# Patient Record
Sex: FEMALE | Race: WHITE | Employment: UNEMPLOYED | URBAN - METROPOLITAN AREA
[De-identification: names, ages, dates, MRNs, and addresses within clinical notes are randomized per-mention and may not be internally consistent; named-entity substitution may affect disease eponyms.]

---

## 2019-04-10 ENCOUNTER — LAB ENCOUNTER (OUTPATIENT)
Dept: LAB | Age: 39
End: 2019-04-10
Attending: NURSE PRACTITIONER
Payer: COMMERCIAL

## 2019-04-10 DIAGNOSIS — Z13.0 SCREENING, ANEMIA, DEFICIENCY, IRON: ICD-10-CM

## 2019-04-10 DIAGNOSIS — Z13.220 SCREENING CHOLESTEROL LEVEL: ICD-10-CM

## 2019-04-10 DIAGNOSIS — Z13.1 SCREENING FOR DIABETES MELLITUS: ICD-10-CM

## 2019-04-10 DIAGNOSIS — Z13.29 SCREENING FOR THYROID DISORDER: ICD-10-CM

## 2019-04-10 PROCEDURE — 85025 COMPLETE CBC W/AUTO DIFF WBC: CPT

## 2019-04-10 PROCEDURE — 84443 ASSAY THYROID STIM HORMONE: CPT

## 2019-04-10 PROCEDURE — 80048 BASIC METABOLIC PNL TOTAL CA: CPT

## 2019-04-10 PROCEDURE — 80061 LIPID PANEL: CPT

## 2019-04-10 PROCEDURE — 36415 COLL VENOUS BLD VENIPUNCTURE: CPT

## 2019-12-05 ENCOUNTER — HOSPITAL ENCOUNTER (OUTPATIENT)
Age: 39
Discharge: HOME | End: 2019-12-05
Payer: COMMERCIAL

## 2019-12-05 ENCOUNTER — APPOINTMENT (OUTPATIENT)
Dept: GENERAL RADIOLOGY | Age: 39
End: 2019-12-05
Attending: PHYSICIAN ASSISTANT
Payer: COMMERCIAL

## 2019-12-05 VITALS
RESPIRATION RATE: 18 BRPM | TEMPERATURE: 98 F | HEART RATE: 102 BPM | DIASTOLIC BLOOD PRESSURE: 83 MMHG | SYSTOLIC BLOOD PRESSURE: 132 MMHG | OXYGEN SATURATION: 100 %

## 2019-12-05 DIAGNOSIS — S29.011A CHEST WALL MUSCLE STRAIN, INITIAL ENCOUNTER: Primary | ICD-10-CM

## 2019-12-05 PROCEDURE — 99203 OFFICE O/P NEW LOW 30 MIN: CPT

## 2019-12-05 PROCEDURE — 71101 X-RAY EXAM UNILAT RIBS/CHEST: CPT | Performed by: PHYSICIAN ASSISTANT

## 2019-12-05 PROCEDURE — 99204 OFFICE O/P NEW MOD 45 MIN: CPT

## 2019-12-05 RX ORDER — LIDOCAINE 50 MG/G
1 PATCH TOPICAL EVERY 24 HOURS
Qty: 3 PATCH | Refills: 0 | Status: SHIPPED | OUTPATIENT
Start: 2019-12-05 | End: 2019-12-13

## 2019-12-05 NOTE — ED INITIAL ASSESSMENT (HPI)
Pt with cough x 3 weeks, started as a dry cough, now productive for 2 weeks, 4 days R sided rib pain; no fever

## 2019-12-05 NOTE — ED PROVIDER NOTES
Patient Seen in: [facility]      History   Patient presents with:  Cough/URI  Pain    Stated Complaint: cough x 3 weeks,rt side chest/rib pain x 3 days    HPI    CHIEF COMPLAINT: Cough for the past 3 weeks, right-sided rib pain systems reviewed and negative except as noted above.     Physical Exam     ED Triage Vitals [12/05/19 1524]   /85   Pulse 107   Resp 14   Temp 98.4 °F (36.9 °C)   Temp src Temporal   SpO2 99 %   O2 Device None (Room air)       Current:/85   Puls Views), Right  (cpt=71101)    Result Date: 12/5/2019  CONCLUSION:  No evidence of a right rib fracture.     Dictated by: Rufus Hatchet, MD on 12/05/2019 at 16:40     Approved by: Rufus Hatchet, MD on 12/05/2019 at 16:40          Patient symptoms are

## 2021-07-22 PROCEDURE — 88175 CYTOPATH C/V AUTO FLUID REDO: CPT | Performed by: NURSE PRACTITIONER

## 2021-07-22 PROCEDURE — 87624 HPV HI-RISK TYP POOLED RSLT: CPT | Performed by: NURSE PRACTITIONER

## 2024-05-29 ENCOUNTER — HOSPITAL ENCOUNTER (OUTPATIENT)
Dept: MAMMOGRAPHY | Age: 44
Discharge: HOME | End: 2024-05-29
Payer: COMMERCIAL

## 2024-05-29 DIAGNOSIS — Z12.31 ENCOUNTER FOR SCREENING MAMMOGRAM FOR BREAST CANCER: ICD-10-CM

## 2024-05-29 PROCEDURE — 77067 SCR MAMMO BI INCL CAD: CPT

## 2024-05-29 PROCEDURE — 77063 BREAST TOMOSYNTHESIS BI: CPT

## 2024-05-31 ENCOUNTER — LAB ENCOUNTER (OUTPATIENT)
Dept: LAB | Age: 44
End: 2024-05-31
Payer: COMMERCIAL

## 2024-05-31 DIAGNOSIS — N91.5 OLIGOMENORRHEA, UNSPECIFIED TYPE: ICD-10-CM

## 2024-05-31 DIAGNOSIS — Z13.1 ENCOUNTER FOR SCREENING EXAMINATION FOR IMPAIRED GLUCOSE REGULATION AND DIABETES MELLITUS: ICD-10-CM

## 2024-05-31 LAB
ALBUMIN SERPL-MCNC: 4.7 G/DL (ref 3.2–4.8)
ALBUMIN/GLOB SERPL: 1.7 {RATIO} (ref 1–2)
ALP LIVER SERPL-CCNC: 62 U/L
ALT SERPL-CCNC: 12 U/L
ANION GAP SERPL CALC-SCNC: 8 MMOL/L (ref 0–18)
AST SERPL-CCNC: 27 U/L (ref ?–34)
BILIRUB SERPL-MCNC: 0.4 MG/DL (ref 0.3–1.2)
BUN BLD-MCNC: 12 MG/DL (ref 9–23)
BUN/CREAT SERPL: 14.8 (ref 10–20)
CALCIUM BLD-MCNC: 9.7 MG/DL (ref 8.7–10.4)
CHLORIDE SERPL-SCNC: 105 MMOL/L (ref 98–112)
CO2 SERPL-SCNC: 29 MMOL/L (ref 21–32)
CREAT BLD-MCNC: 0.81 MG/DL
EGFRCR SERPLBLD CKD-EPI 2021: 92 ML/MIN/1.73M2 (ref 60–?)
ESTRADIOL SERPL-MCNC: 35.6 PG/ML
FASTING STATUS PATIENT QL REPORTED: NO
GLOBULIN PLAS-MCNC: 2.8 G/DL (ref 2–3.5)
GLUCOSE BLD-MCNC: 106 MG/DL (ref 70–99)
HCG SERPL QL: NEGATIVE
OSMOLALITY SERPL CALC.SUM OF ELEC: 294 MOSM/KG (ref 275–295)
POTASSIUM SERPL-SCNC: 3.4 MMOL/L (ref 3.5–5.1)
PROLACTIN SERPL-MCNC: 7.5 NG/ML
PROT SERPL-MCNC: 7.5 G/DL (ref 5.7–8.2)
SODIUM SERPL-SCNC: 142 MMOL/L (ref 136–145)

## 2024-05-31 PROCEDURE — 80061 LIPID PANEL: CPT

## 2024-05-31 PROCEDURE — 36415 COLL VENOUS BLD VENIPUNCTURE: CPT

## 2024-05-31 PROCEDURE — 84443 ASSAY THYROID STIM HORMONE: CPT

## 2024-05-31 PROCEDURE — 83002 ASSAY OF GONADOTROPIN (LH): CPT

## 2024-05-31 PROCEDURE — 84703 CHORIONIC GONADOTROPIN ASSAY: CPT

## 2024-05-31 PROCEDURE — 85025 COMPLETE CBC W/AUTO DIFF WBC: CPT

## 2024-05-31 PROCEDURE — 80053 COMPREHEN METABOLIC PANEL: CPT

## 2024-05-31 PROCEDURE — 84146 ASSAY OF PROLACTIN: CPT

## 2024-05-31 PROCEDURE — 83001 ASSAY OF GONADOTROPIN (FSH): CPT

## 2024-05-31 PROCEDURE — 82670 ASSAY OF TOTAL ESTRADIOL: CPT

## 2024-05-31 PROCEDURE — 82306 VITAMIN D 25 HYDROXY: CPT

## 2024-06-13 ENCOUNTER — OFFICE VISIT (OUTPATIENT)
Facility: CLINIC | Age: 44
End: 2024-06-13
Payer: COMMERCIAL

## 2024-06-13 VITALS
HEIGHT: 66 IN | WEIGHT: 137 LBS | SYSTOLIC BLOOD PRESSURE: 112 MMHG | DIASTOLIC BLOOD PRESSURE: 84 MMHG | BODY MASS INDEX: 22.02 KG/M2

## 2024-06-13 DIAGNOSIS — Z79.890 PREMATURE MENOPAUSE ON HORMONE REPLACEMENT THERAPY: ICD-10-CM

## 2024-06-13 DIAGNOSIS — E28.319 PREMATURE MENOPAUSE ON HORMONE REPLACEMENT THERAPY: ICD-10-CM

## 2024-06-13 DIAGNOSIS — E28.319 PREMATURE MENOPAUSE: Primary | ICD-10-CM

## 2024-06-13 PROCEDURE — 99213 OFFICE O/P EST LOW 20 MIN: CPT | Performed by: OBSTETRICS & GYNECOLOGY

## 2024-06-13 PROCEDURE — 3079F DIAST BP 80-89 MM HG: CPT | Performed by: OBSTETRICS & GYNECOLOGY

## 2024-06-13 PROCEDURE — 3008F BODY MASS INDEX DOCD: CPT | Performed by: OBSTETRICS & GYNECOLOGY

## 2024-06-13 PROCEDURE — 3074F SYST BP LT 130 MM HG: CPT | Performed by: OBSTETRICS & GYNECOLOGY

## 2024-06-13 RX ORDER — MEDROXYPROGESTERONE ACETATE 5 MG/1
5 TABLET ORAL DAILY
Qty: 90 TABLET | Refills: 3 | Status: SHIPPED | OUTPATIENT
Start: 2024-06-13

## 2024-09-26 ENCOUNTER — OFFICE VISIT (OUTPATIENT)
Dept: FAMILY MEDICINE CLINIC | Facility: CLINIC | Age: 44
End: 2024-09-26
Payer: COMMERCIAL

## 2024-09-26 VITALS
WEIGHT: 138.63 LBS | BODY MASS INDEX: 22.82 KG/M2 | RESPIRATION RATE: 20 BRPM | HEART RATE: 107 BPM | TEMPERATURE: 97 F | DIASTOLIC BLOOD PRESSURE: 84 MMHG | HEIGHT: 65.2 IN | SYSTOLIC BLOOD PRESSURE: 140 MMHG

## 2024-09-26 DIAGNOSIS — R53.83 OTHER FATIGUE: ICD-10-CM

## 2024-09-26 DIAGNOSIS — E78.01 FAMILIAL HYPERCHOLESTEROLEMIA: ICD-10-CM

## 2024-09-26 DIAGNOSIS — E55.9 VITAMIN D DEFICIENCY: ICD-10-CM

## 2024-09-26 DIAGNOSIS — E78.5 HYPERLIPIDEMIA, UNSPECIFIED HYPERLIPIDEMIA TYPE: ICD-10-CM

## 2024-09-26 DIAGNOSIS — Z00.00 ROUTINE GENERAL MEDICAL EXAMINATION AT A HEALTH CARE FACILITY: Primary | ICD-10-CM

## 2024-09-26 DIAGNOSIS — Z78.9 VEGAN DIET: ICD-10-CM

## 2024-09-26 DIAGNOSIS — Z13.89 SCREENING FOR GENITOURINARY CONDITION: ICD-10-CM

## 2024-09-26 DIAGNOSIS — Z23 NEED FOR TDAP VACCINATION: ICD-10-CM

## 2024-09-26 DIAGNOSIS — R73.01 IMPAIRED FASTING GLUCOSE: ICD-10-CM

## 2024-09-26 LAB
BASOPHILS # BLD AUTO: 0.05 X10(3) UL (ref 0–0.2)
BASOPHILS NFR BLD AUTO: 0.6 %
BILIRUB UR QL STRIP.AUTO: NEGATIVE
CHOLEST SERPL-MCNC: 221 MG/DL (ref ?–200)
CLARITY UR REFRACT.AUTO: CLEAR
DEPRECATED HBV CORE AB SER IA-ACNC: 29 NG/ML
EOSINOPHIL # BLD AUTO: 0.3 X10(3) UL (ref 0–0.7)
EOSINOPHIL NFR BLD AUTO: 3.9 %
ERYTHROCYTE [DISTWIDTH] IN BLOOD BY AUTOMATED COUNT: 11.6 %
EST. AVERAGE GLUCOSE BLD GHB EST-MCNC: 100 MG/DL (ref 68–126)
FASTING PATIENT LIPID ANSWER: YES
GLUCOSE UR STRIP.AUTO-MCNC: NORMAL MG/DL
HBA1C MFR BLD: 5.1 % (ref ?–5.7)
HCT VFR BLD AUTO: 38.3 %
HDLC SERPL-MCNC: 69 MG/DL (ref 40–59)
HGB BLD-MCNC: 13.7 G/DL
IMM GRANULOCYTES # BLD AUTO: 0.01 X10(3) UL (ref 0–1)
IMM GRANULOCYTES NFR BLD: 0.1 %
IRON SATN MFR SERPL: 33 %
IRON SERPL-MCNC: 105 UG/DL
KETONES UR STRIP.AUTO-MCNC: NEGATIVE MG/DL
LDLC SERPL CALC-MCNC: 131 MG/DL (ref ?–100)
LEUKOCYTE ESTERASE UR QL STRIP.AUTO: NEGATIVE
LYMPHOCYTES # BLD AUTO: 1.72 X10(3) UL (ref 1–4)
LYMPHOCYTES NFR BLD AUTO: 22.2 %
MCH RBC QN AUTO: 32.9 PG (ref 26–34)
MCHC RBC AUTO-ENTMCNC: 35.8 G/DL (ref 31–37)
MCV RBC AUTO: 91.8 FL
MONOCYTES # BLD AUTO: 0.39 X10(3) UL (ref 0.1–1)
MONOCYTES NFR BLD AUTO: 5 %
NEUTROPHILS # BLD AUTO: 5.27 X10 (3) UL (ref 1.5–7.7)
NEUTROPHILS # BLD AUTO: 5.27 X10(3) UL (ref 1.5–7.7)
NEUTROPHILS NFR BLD AUTO: 68.2 %
NITRITE UR QL STRIP.AUTO: NEGATIVE
NONHDLC SERPL-MCNC: 152 MG/DL (ref ?–130)
PH UR STRIP.AUTO: 7 [PH] (ref 5–8)
PLATELET # BLD AUTO: 248 10(3)UL (ref 150–450)
PROT UR STRIP.AUTO-MCNC: NEGATIVE MG/DL
RBC # BLD AUTO: 4.17 X10(6)UL
RBC UR QL AUTO: NEGATIVE
SP GR UR STRIP.AUTO: 1.01 (ref 1–1.03)
TOTAL IRON BINDING CAPACITY: 318 UG/DL (ref 250–425)
TRANSFERRIN SERPL-MCNC: 259 MG/DL (ref 250–380)
TRIGL SERPL-MCNC: 121 MG/DL (ref 30–149)
UROBILINOGEN UR STRIP.AUTO-MCNC: NORMAL MG/DL
VIT B12 SERPL-MCNC: 459 PG/ML (ref 211–911)
VLDLC SERPL CALC-MCNC: 22 MG/DL (ref 0–30)
WBC # BLD AUTO: 7.7 X10(3) UL (ref 4–11)

## 2024-09-26 PROCEDURE — 85025 COMPLETE CBC W/AUTO DIFF WBC: CPT

## 2024-09-26 PROCEDURE — 82607 VITAMIN B-12: CPT

## 2024-09-26 PROCEDURE — 83540 ASSAY OF IRON: CPT

## 2024-09-26 PROCEDURE — 81003 URINALYSIS AUTO W/O SCOPE: CPT

## 2024-09-26 PROCEDURE — 80061 LIPID PANEL: CPT

## 2024-09-26 PROCEDURE — 83036 HEMOGLOBIN GLYCOSYLATED A1C: CPT

## 2024-09-26 PROCEDURE — 82728 ASSAY OF FERRITIN: CPT

## 2024-09-26 PROCEDURE — 83550 IRON BINDING TEST: CPT

## 2024-09-26 RX ORDER — ERGOCALCIFEROL 1.25 MG/1
50000 CAPSULE, LIQUID FILLED ORAL WEEKLY
Qty: 12 CAPSULE | Refills: 0 | Status: SHIPPED | OUTPATIENT
Start: 2024-09-26

## 2024-09-26 RX ORDER — DEXTROAMPHETAMINE SULFATE 10 MG/1
10 TABLET ORAL 2 TIMES DAILY
COMMUNITY
Start: 2024-09-10

## 2024-09-26 NOTE — PROGRESS NOTES
Subjective:   Bertha Sy is a 43 year old female who presents for physical and to establish care.     Was seen on 6/13/24 by Dr Brown (ob/gyne). Is on hormone therapy for premature menopause; managed by gyne.   Does not have periods.     Eats vegan diet. Is very active; has chickens and quail and spends a lot of time taking care of them.     Overdue for Tdap: last vaccine was 5/29/09     Pap and mammogram UTD.     Hyperlipidemia and HTC run in family.     Recommeded UFCT d/t family hx.       History/Other:    Chief Complaint Reviewed and Verified  Nursing Notes Reviewed and   Verified  Tobacco Reviewed  Allergies Reviewed  Medications Reviewed    Problem List Reviewed  Medical History Reviewed  Surgical History   Reviewed  OB Status Reviewed  Family History Reviewed         Tobacco:  She has never smoked tobacco.    Current Outpatient Medications   Medication Sig Dispense Refill    Dextroamphetamine Sulfate 10 MG Oral Tab Take 1 tablet (10 mg total) by mouth 2 (two) times daily.      ergocalciferol 1.25 MG (62107 UT) Oral Cap Take 1 capsule (50,000 Units total) by mouth once a week. Once these are gone, take 2000 IUs daily of OTC Vitamin D3. 12 capsule 0    estrogens conjugated (PREMARIN) 1.25 MG Oral Tab Take 1 tablet (1.25 mg total) by mouth daily. 90 tablet 3    medroxyPROGESTERone Acetate 5 MG Oral Tab Take 1 tablet (5 mg total) by mouth daily. 90 tablet 3    sertraline 50 MG Oral Tab Take 1 tablet (50 mg total) by mouth daily. 90 tablet 0    Dextroamphetamine Sulfate 5 MG Oral Tab Take 1.5 tablets (7.5 mg total) by mouth 2 (two) times daily. 90 tablet 0    Atomoxetine HCl (STRATTERA) 18 MG Oral Cap Take 1 capsule (18 mg total) by mouth 2 (two) times daily. 60 capsule 2    Dextroamphetamine Sulfate 5 MG Oral Tab Take 1.5 tablets (7.5 mg total) by mouth 2 (two) times daily. 90 tablet 0    Atomoxetine HCl (STRATTERA) 18 MG Oral Cap Take 1 capsule (18 mg total) by mouth 2 (two) times daily. 180 capsule  0    Dextroamphetamine Sulfate 5 MG Oral Tab Take 1.5 tablets (7.5 mg total) by mouth 2 (two) times daily. 90 tablet 0         Review of Systems:  Review of Systems   Constitutional:  Positive for fatigue.   HENT: Negative.     Eyes: Negative.    Respiratory: Negative.     Cardiovascular: Negative.    Gastrointestinal: Negative.    Endocrine: Negative.    Genitourinary: Negative.    Musculoskeletal: Negative.    Skin: Negative.    Allergic/Immunologic: Negative.    Neurological: Negative.    Hematological: Negative.    Psychiatric/Behavioral: Negative.           Objective:   /84   Pulse 107   Temp 97.3 °F (36.3 °C)   Resp 20   Ht 5' 5.2\" (1.656 m)   Wt 138 lb 9.6 oz (62.9 kg)   LMP 01/15/2024 (Approximate)   BMI 22.92 kg/m²  Estimated body mass index is 22.92 kg/m² as calculated from the following:    Height as of this encounter: 5' 5.2\" (1.656 m).    Weight as of this encounter: 138 lb 9.6 oz (62.9 kg).  Physical Exam  Constitutional:       General: She is not in acute distress.     Appearance: Normal appearance. She is normal weight. She is not ill-appearing, toxic-appearing or diaphoretic.   HENT:      Head: Normocephalic and atraumatic.      Right Ear: Tympanic membrane, ear canal and external ear normal. There is no impacted cerumen.      Left Ear: Tympanic membrane, ear canal and external ear normal. There is no impacted cerumen.      Nose: Nose normal. No congestion or rhinorrhea.      Mouth/Throat:      Mouth: Mucous membranes are moist.      Pharynx: Oropharynx is clear. No oropharyngeal exudate or posterior oropharyngeal erythema.   Eyes:      General: No scleral icterus.        Right eye: No discharge.         Left eye: No discharge.      Conjunctiva/sclera: Conjunctivae normal.   Cardiovascular:      Rate and Rhythm: Regular rhythm. Tachycardia present.      Pulses: Normal pulses.      Heart sounds: Normal heart sounds. No murmur heard.     No friction rub. No gallop.      Comments: Has mild  tachycardia; likely from stimulants. Pt states she's always had faster HR and this is not bothersome to her.   Pulmonary:      Effort: Pulmonary effort is normal. No respiratory distress.      Breath sounds: Normal breath sounds. No stridor. No wheezing, rhonchi or rales.   Chest:      Chest wall: No tenderness.   Abdominal:      General: Abdomen is flat. Bowel sounds are normal. There is no distension.      Palpations: Abdomen is soft. There is no mass.      Tenderness: There is no abdominal tenderness. There is no right CVA tenderness, left CVA tenderness, guarding or rebound.      Hernia: No hernia is present.   Musculoskeletal:         General: Normal range of motion.      Cervical back: Normal range of motion. No rigidity or tenderness.   Lymphadenopathy:      Cervical: No cervical adenopathy.   Skin:     General: Skin is warm and dry.   Neurological:      General: No focal deficit present.      Mental Status: She is alert and oriented to person, place, and time.   Psychiatric:         Mood and Affect: Mood normal.         Behavior: Behavior normal.         Thought Content: Thought content normal.         Judgment: Judgment normal.       Assessment & Plan:   1. Routine general medical examination at a health care facility (Primary)  2. Hyperlipidemia, unspecified hyperlipidemia type  -     Lipid Panel; Future; Expected date: 09/26/2024  -     Lipid Panel  3. Other fatigue  -     CBC With Differential With Platelet; Future; Expected date: 09/26/2024  -     Iron And Tibc; Future; Expected date: 09/26/2024  -     Ferritin; Future; Expected date: 09/26/2024  -     Vitamin B12; Future; Expected date: 09/26/2024  -     CBC With Differential With Platelet  -     Iron And Tibc  -     Ferritin  -     Vitamin B12  4. Impaired fasting glucose  -     Hemoglobin A1C; Future; Expected date: 09/26/2024  -     Hemoglobin A1C  5. Vitamin D deficiency  6. Vegan diet  -     Iron And Tibc; Future; Expected date: 09/26/2024  -      Ferritin; Future; Expected date: 09/26/2024  -     Vitamin B12; Future; Expected date: 09/26/2024  -     Iron And Tibc  -     Ferritin  -     Vitamin B12  7. Need for Tdap vaccination  -     TdaP (Adacel, Boostrix) [71484]  8. Familial hypercholesterolemia  -     Lipid Panel; Future; Expected date: 09/26/2024  -     Lipid Panel  9. Screening for genitourinary condition  -     Urinalysis with Culture Reflex; Future; Expected date: 09/26/2024  -     Urinalysis with Culture Reflex  Other orders  -     Vitamin D (Ergocalciferol); Take 1 capsule (50,000 Units total) by mouth once a week. Once these are gone, take 2000 IUs daily of OTC Vitamin D3.  Dispense: 12 capsule; Refill: 0        Return in about 2 weeks (around 10/10/2024) for send in BP log.    VELVET Lozano, 9/26/2024, 12:33 PM

## 2024-09-27 DIAGNOSIS — R79.0 LOW SERUM FERRITIN LEVEL: Primary | ICD-10-CM

## 2024-09-27 DIAGNOSIS — E61.1 DIETARY IRON DEFICIENCY WITHOUT ANEMIA: ICD-10-CM

## 2024-11-06 ENCOUNTER — TELEPHONE (OUTPATIENT)
Facility: CLINIC | Age: 44
End: 2024-11-06

## 2024-12-18 ENCOUNTER — TELEPHONE (OUTPATIENT)
Facility: CLINIC | Age: 44
End: 2024-12-18

## 2024-12-27 DIAGNOSIS — E55.9 VITAMIN D DEFICIENCY: Primary | ICD-10-CM

## 2024-12-27 RX ORDER — ERGOCALCIFEROL 1.25 MG/1
50000 CAPSULE, LIQUID FILLED ORAL WEEKLY
Qty: 12 CAPSULE | Refills: 0 | OUTPATIENT
Start: 2024-12-27

## 2024-12-27 NOTE — TELEPHONE ENCOUNTER
Last office visit: ***   Protocol: ***  Requested medication(s) are due for refill today: {Yes/No}  Requested medication(s) are on the active medication list same strength, form, dose/ sig: {Yes/No}  Requested medication(s) are managed by provider: {Yes/No}  Patient has already received a courtsey refill: {Yes/No}    NOV: ***  Last Labs: ***  Asked to Return: ***

## 2025-01-24 ENCOUNTER — OFFICE VISIT (OUTPATIENT)
Facility: CLINIC | Age: 45
End: 2025-01-24
Payer: COMMERCIAL

## 2025-01-24 VITALS
HEIGHT: 62.5 IN | WEIGHT: 137 LBS | BODY MASS INDEX: 24.58 KG/M2 | SYSTOLIC BLOOD PRESSURE: 128 MMHG | DIASTOLIC BLOOD PRESSURE: 82 MMHG

## 2025-01-24 DIAGNOSIS — N84.1 CERVICAL POLYP: ICD-10-CM

## 2025-01-24 DIAGNOSIS — Z01.419 ENCOUNTER FOR WELL WOMAN EXAM WITH ROUTINE GYNECOLOGICAL EXAM: Primary | ICD-10-CM

## 2025-01-24 DIAGNOSIS — Z79.890 PREMATURE MENOPAUSE ON HORMONE REPLACEMENT THERAPY: ICD-10-CM

## 2025-01-24 DIAGNOSIS — Z12.4 SCREENING FOR MALIGNANT NEOPLASM OF CERVIX: ICD-10-CM

## 2025-01-24 DIAGNOSIS — Z12.31 SCREENING MAMMOGRAM FOR BREAST CANCER: ICD-10-CM

## 2025-01-24 DIAGNOSIS — E28.319 PREMATURE MENOPAUSE ON HORMONE REPLACEMENT THERAPY: ICD-10-CM

## 2025-01-24 PROCEDURE — 3074F SYST BP LT 130 MM HG: CPT

## 2025-01-24 PROCEDURE — 88175 CYTOPATH C/V AUTO FLUID REDO: CPT

## 2025-01-24 PROCEDURE — 87624 HPV HI-RISK TYP POOLED RSLT: CPT

## 2025-01-24 PROCEDURE — 3079F DIAST BP 80-89 MM HG: CPT

## 2025-01-24 PROCEDURE — 99396 PREV VISIT EST AGE 40-64: CPT

## 2025-01-24 PROCEDURE — 3008F BODY MASS INDEX DOCD: CPT

## 2025-01-24 RX ORDER — MEDROXYPROGESTERONE ACETATE 5 MG
5 TABLET ORAL DAILY
Qty: 90 TABLET | Refills: 3 | Status: SHIPPED | OUTPATIENT
Start: 2025-01-24

## 2025-01-24 NOTE — PROGRESS NOTES
GYN H&P     Genetic questionnaire reviewed with the patient and she will be referred for genetic counseling if the questionnaire had any positive results.    The [facility] intake form was also reviewed regarding contraception, menstrual periods, urinary health, and vaginal / sexual health    2025  2:46 PM    Chief Complaint   Patient presents with    Gynecologic Exam       HPI: Bertha is a 44 year old  No LMP recorded (lmp unknown). (Menstrual status: Irregular Periods).  (contraception: vasectomy) here for her annual gyn exam.     She has no complaints.  Hx of premature menopause as diagnosed by elevated FSH levels in  - was started on HRT (1.25 mg estradiol and 5mg MPA daily). Denies any vaginal bleeding. Denies any pelvic or breast complaints.     Previous encounters and chart reviewed.     OB History    Para Term  AB Living   4 3 3   1 3   SAB IAB Ectopic Multiple Live Births           3      # Outcome Date GA Lbr Martinez/2nd Weight Sex Type Anes PTL Lv   4 Term 02/21/15 39w0d 15:11 / 00:51 6 lb 4.4 oz (2.845 kg) M NORMAL SPONT EPI N KJ   3 Term  40w0d  7 lb (3.175 kg) F Vag-Spont   KJ   2 Term 08 40w0d  6 lb 12 oz (3.062 kg) M Vag-Spont   KJ   1 AB                GYN hx:   Menarche: 14  Period Cycle (Days): 6 months  Period Duration (Days): 4 days  Use of Birth Control (if yes, specify type): Vasectomy  Pap Date: 21  Pap Result Notes: Neg/Neg; 2016 neg,neg  (2014 neg,neg  2012 neg,neg  2010 neg)  Follow Up Recommendation: pap due today      Past Medical History:    Anxiety     Past Surgical History:   Procedure Laterality Date    D & c      EAB    Other surgical history      SEPTOPLASTY     Allergies[1]  Medications Ordered Prior to Encounter[2]  Family History   Problem Relation Age of Onset    Obesity Mother     High Cholesterol Mother     Other (Other) Mother         Stroke, thalmic pain syndrome    Heart Attack Father     Heart  Surgery Father         PCI/stent    Hypertension Father     High Cholesterol Father     Heart Disease Father 55        MI    No Known Problems Sister     Lipids Brother     Psychiatric Brother         Depression    No Known Problems Brother     Ovarian Cancer Maternal Grandmother 90    Heart Disease Paternal Grandmother     Heart Disease Paternal Grandfather     Cancer Other         No family hx Breast/Ovarian/Colon Ca     Social History     Socioeconomic History    Marital status:      Spouse name: Not on file    Number of children: Not on file    Years of education: Not on file    Highest education level: Not on file   Occupational History    Not on file   Tobacco Use    Smoking status: Never     Passive exposure: Never    Smokeless tobacco: Never   Vaping Use    Vaping status: Never Used   Substance and Sexual Activity    Alcohol use: No    Drug use: No    Sexual activity: Not Currently     Partners: Male     Birth control/protection: Vasectomy   Other Topics Concern    Not on file   Social History Narrative    Not on file     Social Drivers of Health     Financial Resource Strain: Not on file   Food Insecurity: Not on file   Transportation Needs: Not on file   Physical Activity: Not on file   Stress: Not on file   Social Connections: Not on file   Housing Stability: Not on file       ROS:     Review of Systems:  General: denies fevers, chills, fatigue and malaise.   Eyes: no visual changes, denies headaches  ENT: no complaints, denies earaches, runny nose, epistaxis, throat pain or sore throat  Respiratory: denies SOB, dyspnea, cough or wheezing  Cardiovascular: denies chest pain, palpitations, exercise intolerance   GI: denies abdominal pain, diarrhea, constipation  : no complaints, denies dysuria, increased urinary frequency. Menses absent, no dyspareunia   Hematological/lymphatic: denies history of excessive bleeding or bruising, denies dizziness, lightheadedness.   Breast: denies rashes, skin  changes, pain, lumps or discharge   Psychiatric: denies depression, changes in sleep patterns, anxiety  Endocrine: denies hot or cold intolerance, mood changes   Neurological: denies changes in sight, smell, hearing or taste. Denies seizures or tremors  Immunological: denies allergies, denies anaphylaxis, or swollen lymph nodes  Musculoskeletal: denies joint pain, morning stiffness, decreased range of motion         O /82   Ht 62.5\"   Wt 137 lb (62.1 kg)   LMP  (LMP Unknown)   BMI 24.66 kg/m²         Wt Readings from Last 6 Encounters:   01/24/25 137 lb (62.1 kg)   09/26/24 138 lb 9.6 oz (62.9 kg)   06/13/24 137 lb (62.1 kg)   06/14/23 137 lb (62.1 kg)   01/12/22 138 lb (62.6 kg)   07/22/21 134 lb (60.8 kg)     Exam:   GENERAL: well developed, well nourished, in no apparent distress, oriented.  SKIN: no rashes, no suspicious lesions  HEENT: normal  NECK: supple; no thyromegaly, no adenopathy  LUNGS: clear to auscultation  CARDIOVASCULAR: normal S1, S2, RRR  BREASTS: soft, nontender, no palpable masses or nodes, no nipple discharge, no skin changes, no axillary adenopathy  ABDOMEN: no scars,  soft, non distended; non tender, no masses  PELVIC: External Genitalia: Normal appearing, no lesions.    Vagina: normal pink mucosa, no lesions, normal clear discharge.    Bladder well supported.  No  anterior or posterior hernias    Cervix: multiparous, no lesions , No CMT, 4 cervical polyps at the internal os      Uterus: AVAF, mobile, non tender, normal size    Adnexa: non tender, no masses, normal size    Rectal: deferred  EXTREMITIES:  non tender without edema        A/P: Patient is 44 year old female with no complaints. Here for well woman exam.            Patient counseled on:    Diet/exercise.      Self Breast Exams     Safe sex practices / and living environment     Vaccines:  Annual Flu          Pap: neg/neg - Year:  7/2021, collected today  GC/Chlamydia:  n/a  Mammogram:  5/2024, reordered   Dexa:   n/a  Colonoscopy / Cologuard:   n/a, start at age 45  Lipid / Cholesterol:  2024 per PCP         Meds This Visit:    Requested Prescriptions     Signed Prescriptions Disp Refills    estrogens conjugated (PREMARIN) 1.25 MG Oral Tab 90 tablet 3     Sig: Take 1 tablet (1.25 mg total) by mouth daily.    medroxyPROGESTERone Acetate 5 MG Oral Tab 90 tablet 3     Sig: Take 1 tablet (5 mg total) by mouth daily.       1. Encounter for well woman exam with routine gynecological exam    2. Screening mammogram for breast cancer  - [facility] TORI 2D+3D SCREENING BILAT (CPT=77067/85747); Future    3. Screening for malignant neoplasm of cervix  - Hpv High Risk , Thin Prep Collect; Future  - ThinPrep PAP Smear B; Future    4. Premature menopause on hormone replacement therapy  - estrogens conjugated (PREMARIN) 1.25 MG Oral Tab; Take 1 tablet (1.25 mg total) by mouth daily.  Dispense: 90 tablet; Refill: 3  - medroxyPROGESTERone Acetate 5 MG Oral Tab; Take 1 tablet (5 mg total) by mouth daily.  Dispense: 90 tablet; Refill: 3    5. Cervical polyp    Doing well on HRT for premature menopause, refill sent to pharmacy  To return for cervical polypectomy    Return in about 2 weeks (around 2/7/2025) for polpectomy.    Bronwyn Saavedra, APRN   1/24/2025  2:46 PM       This note was created by [employer] voice recognition. Errors in content may be related to improper recognition by the system; efforts to review and correct have been done but errors may still exist. Please contact me with any questions.    Note to patient and family   The 21st Century Cures Act makes medical notes available to patients in the interest of transparency.  However, please be advised that this is a medical document.  It is intended as [id number] communication.  It is written in medical language which may contain abbreviations or verbiage that are technical and unfamiliar.  It may appear blunt or direct.  Medical documents are intended to carry relevant information, facts  as evident, and the clinical opinion of the practitioner.           [1] No Known Allergies  [2]   Current Outpatient Medications on File Prior to Visit   Medication Sig Dispense Refill    Dextroamphetamine Sulfate 10 MG Oral Tab Take 1 tablet (10 mg total) by mouth 2 (two) times daily.      ergocalciferol 1.25 MG (00835 UT) Oral Cap Take 1 capsule (50,000 Units total) by mouth once a week. Once these are gone, take 2000 IUs daily of OTC Vitamin D3. 12 capsule 0    sertraline 50 MG Oral Tab Take 1 tablet (50 mg total) by mouth daily. 90 tablet 0    Dextroamphetamine Sulfate 5 MG Oral Tab Take 1.5 tablets (7.5 mg total) by mouth 2 (two) times daily. 90 tablet 0    Atomoxetine HCl (STRATTERA) 18 MG Oral Cap Take 1 capsule (18 mg total) by mouth 2 (two) times daily. 60 capsule 2    Dextroamphetamine Sulfate 5 MG Oral Tab Take 1.5 tablets (7.5 mg total) by mouth 2 (two) times daily. (Patient not taking: Reported on 1/24/2025) 90 tablet 0    Atomoxetine HCl (STRATTERA) 18 MG Oral Cap Take 1 capsule (18 mg total) by mouth 2 (two) times daily. (Patient not taking: Reported on 1/24/2025) 180 capsule 0    Dextroamphetamine Sulfate 5 MG Oral Tab Take 1.5 tablets (7.5 mg total) by mouth 2 (two) times daily. (Patient not taking: Reported on 1/24/2025) 90 tablet 0     No current facility-administered medications on file prior to visit.

## 2025-01-27 LAB — HPV E6+E7 MRNA CVX QL NAA+PROBE: NEGATIVE

## 2025-03-17 NOTE — PROGRESS NOTES
Subjective:   Bertha Sy is a 44 year old female who presents for Blood Pressure     LOV pt established here on 9/26/24 and we discussed possible HTN. She was instructed to record home BPs for 2 weeks and return to clinic around 10/10/24.     Saw Ob/Gyne 1/24/25 and had pap/HPV; normal. BP at that visit was 128/82.     I retook manual BP (136/80) in room with pt; right arm immediately after she demonstrated taking it with her electronic cuff (140/93). We discussed that there is a large difference in the diastolic number, but this is one number for me to compare.     Pt states that with her mom having had a stroke and her 46 year old  recently having an MI, she is concerned about her risk.     We discussed her ASCVD risk based on most recent biometrics.      The 10-year ASCVD risk score (Jim WILSON, et al., 2019) is: 0.6%    Values used to calculate the score:      Age: 44 years      Sex: Female      Is Non- : No      Diabetic: No      Tobacco smoker: No      Systolic Blood Pressure: 130 mmHg      Is BP treated: No      HDL Cholesterol: 69 mg/dL      Total Cholesterol: 221 mg/dL  Advised that based off of these numbers, I do not have concerns at this time about her blood pressure and that based on the numbers I have, I do not feel BP medication is right for her at this time.     We discussed [facility]'s voluntary screenings for the ultra fast CT heart scan and the PV screenings, which include ultrasound screenings of the carotids, abdominal aorta, and the legs.     Pt states she may do one of both of these studies to reassure herself and give her peace of mind in regards to her modifiable risk factors for a heart event.         History/Other:    Chief Complaint Reviewed and Verified  No Further Nursing Notes to   Review  Tobacco Reviewed  Allergies Reviewed  Medications Reviewed    Medical History Reviewed  Surgical History Reviewed  Family History   Reviewed  Social  History Reviewed         Tobacco:  She has never smoked tobacco.    Current Outpatient Medications   Medication Sig Dispense Refill    atomoxetine 40 MG Oral Cap Take by mouth 2 (two) times daily.      estrogens conjugated (PREMARIN) 1.25 MG Oral Tab Take 1 tablet (1.25 mg total) by mouth daily. 90 tablet 3    medroxyPROGESTERone Acetate 5 MG Oral Tab Take 1 tablet (5 mg total) by mouth daily. 90 tablet 3    Dextroamphetamine Sulfate 10 MG Oral Tab Take 1 tablet (10 mg total) by mouth 2 (two) times daily.      ergocalciferol 1.25 MG (52689 UT) Oral Cap Take 1 capsule (50,000 Units total) by mouth once a week. Once these are gone, take 2000 IUs daily of OTC Vitamin D3. 12 capsule 0    sertraline 50 MG Oral Tab Take 1 tablet (50 mg total) by mouth daily. 90 tablet 0         Review of Systems:  Review of Systems   All other systems reviewed and are negative.      Objective:   /82 (BP Location: Left arm, Patient Position: Sitting, Cuff Size: adult)   Pulse (!) 130   Resp 18   Ht 5' 6\" (1.676 m)   Wt 138 lb (62.6 kg)   LMP  (LMP Unknown)   SpO2 99%   BMI 22.27 kg/m²  Estimated body mass index is 22.27 kg/m² as calculated from the following:    Height as of this encounter: 5' 6\" (1.676 m).    Weight as of this encounter: 138 lb (62.6 kg).  Physical Exam  Vitals reviewed.   Constitutional:       Appearance: Normal appearance.   HENT:      Head: Normocephalic and atraumatic.   Eyes:      General: No scleral icterus.        Right eye: No discharge.         Left eye: No discharge.      Conjunctiva/sclera: Conjunctivae normal.   Cardiovascular:      Rate and Rhythm: Normal rate.      Pulses: Normal pulses.   Pulmonary:      Effort: Pulmonary effort is normal.   Neurological:      General: No focal deficit present.      Mental Status: She is alert and oriented to person, place, and time.   Psychiatric:         Mood and Affect: Mood normal.         Behavior: Behavior normal.         Thought Content: Thought content  normal.         Judgment: Judgment normal.       Assessment & Plan:   1. Elevated blood pressure reading without diagnosis of hypertension (Primary)  2. Familial hypercholesterolemia  3. Anxiety state  4. Family history of stroke        Return in about 6 months (around 9/27/2025) for Physical.    VELVET Lozano, 3/17/2025, 11:47 AM

## 2025-03-21 ENCOUNTER — OFFICE VISIT (OUTPATIENT)
Dept: FAMILY MEDICINE CLINIC | Facility: CLINIC | Age: 45
End: 2025-03-21
Payer: COMMERCIAL

## 2025-03-21 VITALS
SYSTOLIC BLOOD PRESSURE: 130 MMHG | DIASTOLIC BLOOD PRESSURE: 82 MMHG | HEART RATE: 130 BPM | BODY MASS INDEX: 22.18 KG/M2 | HEIGHT: 66 IN | OXYGEN SATURATION: 99 % | WEIGHT: 138 LBS | RESPIRATION RATE: 18 BRPM

## 2025-03-21 DIAGNOSIS — F41.1 ANXIETY STATE: ICD-10-CM

## 2025-03-21 DIAGNOSIS — R03.0 ELEVATED BLOOD PRESSURE READING WITHOUT DIAGNOSIS OF HYPERTENSION: Primary | ICD-10-CM

## 2025-03-21 DIAGNOSIS — Z82.3 FAMILY HISTORY OF STROKE: ICD-10-CM

## 2025-03-21 DIAGNOSIS — E78.01 FAMILIAL HYPERCHOLESTEROLEMIA: ICD-10-CM

## 2025-03-21 PROCEDURE — 99213 OFFICE O/P EST LOW 20 MIN: CPT

## 2025-03-21 PROCEDURE — 3075F SYST BP GE 130 - 139MM HG: CPT

## 2025-03-21 PROCEDURE — 3079F DIAST BP 80-89 MM HG: CPT

## 2025-03-21 PROCEDURE — 3008F BODY MASS INDEX DOCD: CPT

## 2025-03-21 RX ORDER — ATOMOXETINE 40 MG/1
CAPSULE ORAL 2 TIMES DAILY
COMMUNITY
Start: 2025-03-06